# Patient Record
Sex: MALE | Employment: STUDENT | URBAN - METROPOLITAN AREA
[De-identification: names, ages, dates, MRNs, and addresses within clinical notes are randomized per-mention and may not be internally consistent; named-entity substitution may affect disease eponyms.]

---
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
## 2025-07-13 ENCOUNTER — HOSPITAL ENCOUNTER (OUTPATIENT)
Age: 25
Discharge: HOME | End: 2025-07-13
Payer: COMMERCIAL

## 2025-07-13 VITALS
RESPIRATION RATE: 16 BRPM | OXYGEN SATURATION: 99 % | DIASTOLIC BLOOD PRESSURE: 87 MMHG | SYSTOLIC BLOOD PRESSURE: 137 MMHG | TEMPERATURE: 98 F | HEART RATE: 71 BPM

## 2025-07-13 DIAGNOSIS — J02.9 ACUTE VIRAL PHARYNGITIS: Primary | ICD-10-CM

## 2025-07-13 LAB — S PYO AG THROAT QL: NEGATIVE

## 2025-07-13 PROCEDURE — 87880 STREP A ASSAY W/OPTIC: CPT | Performed by: PHYSICIAN ASSISTANT

## 2025-07-13 PROCEDURE — 99203 OFFICE O/P NEW LOW 30 MIN: CPT | Performed by: PHYSICIAN ASSISTANT

## 2025-07-13 RX ORDER — FLUTICASONE PROPIONATE 50 MCG
SPRAY, SUSPENSION (ML) NASAL DAILY
COMMUNITY

## 2025-07-13 RX ORDER — DEXAMETHASONE SODIUM PHOSPHATE 10 MG/ML
10 INJECTION, SOLUTION INTRAMUSCULAR; INTRAVENOUS ONCE
Status: COMPLETED | OUTPATIENT
Start: 2025-07-13 | End: 2025-07-13

## 2025-07-13 RX ORDER — LORATADINE 10 MG/1
10 TABLET, ORALLY DISINTEGRATING ORAL DAILY
COMMUNITY

## 2025-07-13 NOTE — DISCHARGE INSTRUCTIONS
Alternate Tylenol (acetaminophen) / Motrin (ibuprofen) every 3 hours as needed for pain or fever > 100.4   Drink plenty of fluids including warm tea with honey/lemon   Get plenty of rest     You may benefit from using a humidifier  Avoid having air blow on your face    Wash hands often  Throw away your current toothbrush in 24 hours and get a new one  Disinfect your environment  Do not share utensils or drinks    Follow up with your primary care provider    If you experience severe/worsening pain, difficulty swallowing or breathing, facial or neck swelling, drooling, change in voice, or any other concerning symptoms, go to nearest ER immediately

## 2025-07-13 NOTE — ED INITIAL ASSESSMENT (HPI)
Patient has had a sore throat for 1-2 weeks. He feels it mostly to the right side. His tonsil are mildly swollen, but more swollen to the left tonsil. The sore throat has fluctuated, but worse in the last couple of days.

## 2025-07-13 NOTE — ED PROVIDER NOTES
Chief Complaint   Patient presents with    Sore Throat       History obtained from: patient   services not used    HPI:     Ariel Fontanez is a 24 year old male who presents with intermittent sore throat x 1 to 2 weeks that has been worse the past 1 to 2 days.  Patient also notes postnasal drip.  Patient taking naproxen and throat lozenges with minimal relief.  Patient complains of pain with swallowing but continues to tolerate oral intake.  Denies fevers, chills, facial or neck swelling, drooling, voice change, cough, sinus pressure, ear pain, neck pain or stiffness, chest pain, shortness of breath, abdominal pain, vomiting, rash.    PMH  Past Medical History[1]    PFSH    PFS asessment screens reviewed and agree.  Nurses notes reviewed I agree with documentation.    Family History[2]  Family history reviewed with patient/caregiver and is not pertinent to presenting problem.  Social History     Socioeconomic History    Marital status: Single     Spouse name: Not on file    Number of children: Not on file    Years of education: Not on file    Highest education level: Not on file   Occupational History    Not on file   Tobacco Use    Smoking status: Never    Smokeless tobacco: Never   Vaping Use    Vaping status: Never Used   Substance and Sexual Activity    Alcohol use: Yes     Comment: social    Drug use: Yes     Types: Cannabis     Comment: social    Sexual activity: Not on file   Other Topics Concern    Not on file   Social History Narrative    Not on file     Social Drivers of Health     Food Insecurity: Not on file   Transportation Needs: Not on file   Housing Stability: Not on file         ROS:   Positive for stated complaint: sore throat   Other systems are as noted in HPI.   All other systems reviewed and negative except as noted above.    Physical Exam:   Vital signs and nursing note reviewed.       /87   Pulse 71   Temp 98.1 °F (36.7 °C) (Oral)   Resp 16   SpO2 99%     GENERAL: well  developed, no acute distress, non-toxic appearing   SKIN: good skin turgor, no obvious rashes  HEAD: normocephalic, atraumatic  EYES: sclera non-icteric bilaterally, conjunctiva clear bilaterally  EARS: canals clear bilaterally, TMs clear bilaterally  NOSE: nasal congestion, no sinus tenderness   OROPHARYNX: MMM, pharynx mildly erythematous, tonsils 2+ equal and symmetric bilaterally with erythema, no exudates, uvula midline, no tongue elevation, maintaining airway and secretions  NECK: supple, no lymphadenopathy, no nuchal rigidity, no trismus, no edema, phonation normal    CARDIO: RRR, normal heart sounds   LUNGS: clear to auscultation bilaterally, no increased WOB, no rales, rhonchi, or wheezes  GI: abdomen soft and non-tender   EXTREMITIES: no cyanosis or edema, GARCÍA without difficulty  NEURO: no focal deficits  PSYCH: alert and oriented x3, answering questions appropriately, mood appropriate    MDM/Assessment/Plan:   Orders for this encounter:    Orders Placed This Encounter    POCT Rapid Strep    POCT Rapid Strep    dexamethasone PF (Decadron) 10 mg/mL vial as ORAL solution 10 mg       Labs performed this visit:  Recent Results (from the past 10 hours)   POCT Rapid Strep    Collection Time: 07/13/25  9:25 AM   Result Value Ref Range    POCT Rapid Strep Negative Negative       Imaging performed this visit:  No orders to display       Medical Decision Making  DDx includes strep pharyngitis versus viral pharyngitis versus allergic rhinitis versus other.  Patient is overall very well-appearing with stable vitals and tolerating oral intake.  No signs of PTA.  Strep negative.  Patient given one-time dose of Decadron in IC for symptomatic management which he tolerated well, no signs of immediate reaction.  Discussed supportive care including rest, increased fluid intake, and OTC Tylenol/Motrin as needed for pain or fevers.  Discussed infection control.  Instructed patient to go directly to nearest ER with any  worsening or concerning symptoms.  Follow-up with PCP.    Amount and/or Complexity of Data Reviewed  Labs: ordered.    Risk  OTC drugs.          Diagnosis:    ICD-10-CM    1. Acute viral pharyngitis  J02.9           All results reviewed and discussed with patient/patient's family. Patient/patient's family verbalize excellent understanding of instructions and feels comfortable with plan. All of patient's/patient's family's questions were addressed.   See AVS for detailed discharge instructions for your condition today.    Follow Up with:  Jumana Bowman MD  [address] [address]  [phone]            Note: This document was dictated using Dragon medical dictation software.  Proofreading was performed to the best of my ability, but errors may be present.    Gloria Julio PA-C         [1] History reviewed. No pertinent past medical history.  [2] History reviewed. No pertinent family history.